# Patient Record
Sex: MALE | URBAN - METROPOLITAN AREA
[De-identification: names, ages, dates, MRNs, and addresses within clinical notes are randomized per-mention and may not be internally consistent; named-entity substitution may affect disease eponyms.]

---

## 2017-03-30 NOTE — PATIENT DISCUSSION
MACULAR HOLE, OS:  HOLE LARGER TODAY. DISCUSSED OPTIONS OF NOT DOING ANYTHING AND CONTINUING TO MONITOR, OPTION OF SURGERY PPV/MP/ICG OS. PT WILL CALL IF SHE DECIDES TO GO FORTH WITH SURGERY.

## 2017-05-08 NOTE — PATIENT DISCUSSION
MILD DRY EYES: PRESCRIBED ARTIFICIAL TEARS BID - QID, OU RETURN FOR FOLLOW-UP AS SCHEDULED OR SOONER IF SYMPTOMS WORSEN. ADVISED PT TO TRY PAZEO.

## 2017-06-14 NOTE — PATIENT DISCUSSION
S/P PE IOL,OS. DOING WELL. CONTINUE DROPS AS DIRECTED. SPECS RX OFFERED. RX ARC IN SPECS TO MINIMIZE GLARE. RETURN FOR FOLLOW-UP AS SCHEDULED.

## 2017-06-21 NOTE — PATIENT DISCUSSION
MACULAR HOLE, OS:  LIKELY TO PROCEED WITH SURGERY. W/IN A MONTH - PPV/MP/ICG OS. Dung Edouard RETURN FOR FOLLOW-UP AS SCHEDULED.

## 2017-06-21 NOTE — PATIENT DISCUSSION
Macular Hole Counseling:  I have explained to the patient at length the diagnosis of macular hole and its pathophysiology. I have discussed the possible need for surgery, which will be determined by the retinal specialist . Return for follow-up as scheduled.

## 2017-08-02 NOTE — PATIENT DISCUSSION
POST OP OS: 90% GAS. ADVISED PT TO BE FACE DOWN 1-2 DAYS AS MUCH AS POSSIBLE. PT DOING WELL TODAY. RETINA IS ATTACHED. IOP IS WITHIN ACCEPTABLE LIMITS. VISION SHOULD START TO IMPROVE. IF ANY WORSENING OF VISION OR PAIN, PT IS TO CALL IMMEDIATELY. RETINAL DETACHMENT AND ENDOPHTHALMTIS PRECAUTIONS REVIEWED. INSTRUCTED PATIENT TO WEAR EYE SHIELD WHEN SLEEPING FOR THE FIRST WEEK AFTER SURGERY.

## 2017-08-09 NOTE — PATIENT DISCUSSION
POST OP OS: PT DOING WELL TODAY. RETINA IS ATTACHED. IOP IS WITHIN ACCEPTABLE LIMITS. VISION SHOULD START TO IMPROVE. IF ANY WORSENING OF VISION OR PAIN, PT IS TO CALL IMMEDIATELY. RETINAL DETACHMENT AND ENDOPHTHALMTIS PRECAUTIONS REVIEWED. INSTRUCTED PATIENT TO WEAR EYE SHIELD WHEN SLEEPING FOR THE FIRST WEEK AFTER SURGERY.

## 2017-11-06 NOTE — PATIENT DISCUSSION
MACULAR HOLE OS:  MAC HOLE CLOSED. NO FLUID. EXPLAINED THAT RETINA IS BRAIN TISSUE AND VA CAN GET BETTER OVER TIME, BUT IT IS NOT INSTANT AS CAT SX IS.

## 2018-07-11 NOTE — PATIENT DISCUSSION
PT ADVISED TO USE A THICKER ARTIFICIAL TEAR SUCH AS CELLUVISC OR REFRESH GEL DROPS TO HELP COAT THE EYE BETTER TO HELP WITH DRYNESS. ADVISED PT IT WILL BLUR VISION TEMPORARILY BC IT IS A THICKER DROP, BUT WILL HELP SOOTHE EYES AND HELP ALLEVIATE SYMPTOMS.

## 2019-01-20 NOTE — PATIENT DISCUSSION
CATARACT OS: REFER TO DR FARIA FOR CAT SX OS. Over shift, pt was transitioned from BiPAP 10/5 to HHFO 45L 30%    Dr. Asher Rx to keep flow high and Fi02 low, if possible.     Sp02 88-92% appropriate for this pt per MD.     Paul Q4    Home 02 3.5-4L continuously

## 2019-01-23 NOTE — PATIENT DISCUSSION
"AMD (Dry) Counseling: I have instructed the patient to take an AREDS 2 vitamin mixture to minimize the risk of developing ""wet"" macular degeneration disease progression. The importance of daily monitoring with Amsler grid was emphasized and an Amsler grid was provided if the patient did not have one. The patient was advised to call the office if new symptoms of persistent blurring or distortion of vision arises as evaluation and possible treatment is necessary to preserve as much vision as possible. Return for follow-up as scheduled. "

## 2019-01-23 NOTE — PATIENT DISCUSSION
New Prescription: Acular (ketorolac): drops: 0.5% 1 drop four times a day as needed into affected eye 01-

## 2019-01-23 NOTE — PATIENT DISCUSSION
Diabetes without Retinopathy Counseling :  I have discussed with the patient the importance of controlling blood glucose, blood pressure and lipid levels levels  to minimize the risk of developing retinal disease from diabetes. I explained the importance of annual dilated eye exams because effective treatment for diabetic retinopathy depends on timely intervention. The patient was instructed to call or return sooner if they noticed blurred or distorted vision, fluctuating vision, pain or redness around one or both eyes. Return for follow-up as scheduled.

## 2019-08-02 ENCOUNTER — IMPORTED ENCOUNTER (OUTPATIENT)
Dept: URBAN - METROPOLITAN AREA CLINIC 50 | Facility: CLINIC | Age: 69
End: 2019-08-02

## 2019-08-26 ENCOUNTER — IMPORTED ENCOUNTER (OUTPATIENT)
Dept: URBAN - METROPOLITAN AREA CLINIC 50 | Facility: CLINIC | Age: 69
End: 2019-08-26

## 2019-10-02 ENCOUNTER — IMPORTED ENCOUNTER (OUTPATIENT)
Dept: URBAN - METROPOLITAN AREA CLINIC 50 | Facility: CLINIC | Age: 69
End: 2019-10-02

## 2020-01-08 ENCOUNTER — IMPORTED ENCOUNTER (OUTPATIENT)
Dept: URBAN - METROPOLITAN AREA CLINIC 50 | Facility: CLINIC | Age: 70
End: 2020-01-08

## 2020-01-13 ENCOUNTER — IMPORTED ENCOUNTER (OUTPATIENT)
Dept: URBAN - METROPOLITAN AREA CLINIC 50 | Facility: CLINIC | Age: 70
End: 2020-01-13

## 2020-01-16 ENCOUNTER — IMPORTED ENCOUNTER (OUTPATIENT)
Dept: URBAN - METROPOLITAN AREA CLINIC 50 | Facility: CLINIC | Age: 70
End: 2020-01-16

## 2020-02-17 ENCOUNTER — IMPORTED ENCOUNTER (OUTPATIENT)
Dept: URBAN - METROPOLITAN AREA CLINIC 50 | Facility: CLINIC | Age: 70
End: 2020-02-17

## 2020-02-27 ENCOUNTER — IMPORTED ENCOUNTER (OUTPATIENT)
Dept: URBAN - METROPOLITAN AREA CLINIC 50 | Facility: CLINIC | Age: 70
End: 2020-02-27

## 2020-03-03 ENCOUNTER — IMPORTED ENCOUNTER (OUTPATIENT)
Dept: URBAN - METROPOLITAN AREA CLINIC 50 | Facility: CLINIC | Age: 70
End: 2020-03-03

## 2020-03-12 ENCOUNTER — IMPORTED ENCOUNTER (OUTPATIENT)
Dept: URBAN - METROPOLITAN AREA CLINIC 50 | Facility: CLINIC | Age: 70
End: 2020-03-12

## 2020-05-08 ENCOUNTER — IMPORTED ENCOUNTER (OUTPATIENT)
Dept: URBAN - METROPOLITAN AREA CLINIC 50 | Facility: CLINIC | Age: 70
End: 2020-05-08

## 2020-05-18 ENCOUNTER — IMPORTED ENCOUNTER (OUTPATIENT)
Dept: URBAN - METROPOLITAN AREA CLINIC 50 | Facility: CLINIC | Age: 70
End: 2020-05-18

## 2020-06-05 ENCOUNTER — IMPORTED ENCOUNTER (OUTPATIENT)
Dept: URBAN - METROPOLITAN AREA CLINIC 50 | Facility: CLINIC | Age: 70
End: 2020-06-05

## 2020-10-07 ENCOUNTER — IMPORTED ENCOUNTER (OUTPATIENT)
Dept: URBAN - METROPOLITAN AREA CLINIC 50 | Facility: CLINIC | Age: 70
End: 2020-10-07

## 2020-11-19 ENCOUNTER — IMPORTED ENCOUNTER (OUTPATIENT)
Dept: URBAN - METROPOLITAN AREA CLINIC 50 | Facility: CLINIC | Age: 70
End: 2020-11-19

## 2021-04-23 ASSESSMENT — VISUAL ACUITY
OD_OTHER: >20/400. 20/50.
OS_SC: 20/40
OD_CC: 20/70-2
OS_SC: 20/30+2
OD_CC: J1+
OD_CC: J2
OD_BAT: 20/70
OD_PH: 20/20
OS_OTHER: >20/400. >20/400.
OD_SC: 20/60
OD_PH: 20/30
OD_OTHER: >20/400. 20/50.
OD_BAT: >20/400
OD_OTHER: >20/400.
OD_SC: 20/60+2
OS_SC: 20/25
OS_OTHER: 20/30. 20/30.
OD_OTHER: 20/30. 20/50.
OD_PH: 20/30
OD_BAT: >20/400
OS_BAT: 20/20
OS_SC: 20/20
OD_SC: 20/50
OD_SC: 20/50-1
OS_BAT: 20/30
OD_CC: 20/80
OS_CC: 20/40-2
OD_PH: 20/20
OS_CC: J2
OS_BAT: >20/400
OS_OTHER: >20/400. >20/400.
OD_CC: J2
OD_SC: 20/50+1
OS_BAT: >20/400
OS_BAT: >20/400
OS_OTHER: 20/20. 20/30.
OD_OTHER: 20/70.
OS_CC: 20/30+
OS_CC: J1+
OS_CC: J2
OD_BAT: >20/400
OD_BAT: 20/30
OS_SC: 20/40
OD_SC: 20/40-2
OS_CC: 20/60-1
OS_OTHER: >20/400.

## 2021-04-23 ASSESSMENT — TONOMETRY
OS_IOP_MMHG: 15
OS_IOP_MMHG: 16
OD_IOP_MMHG: 19
OS_IOP_MMHG: 18
OD_IOP_MMHG: 16
OD_IOP_MMHG: 16
OD_IOP_MMHG: 20
OD_IOP_MMHG: 14
OS_IOP_MMHG: 16
OD_IOP_MMHG: 15
OS_IOP_MMHG: 16
OS_IOP_MMHG: 19

## 2021-05-24 NOTE — PATIENT DISCUSSION
POSTERIOR CAPSULAR FIBROSIS, OU___ - WORSENING/VISUALLY SIGNIFICANT. SCHEDULE YAG CAPSULOTOMY OS___ FIRST THEN LATER YAG CAPSULOTOMY OD____ IF VISUAL SYMPTOMS PERSIST.

## 2021-05-24 NOTE — PATIENT DISCUSSION
Posterior Capsular Fibrosis Counseling: The diagnosis of posterior capsular fibrosis (PCF), also referred to as a secondary cataract or posterior capsular opacification (PCO), was discussed with the patient. The patient understands that their symptoms and limitations are likely related to this condition. I have reviewed the risks, benefits and alternatives of  YAG laser surgery for the treatment of the fibrosis. The uncommon risk of an increase in intraocular pressure or a retinal detachment and their associated symptoms were explained to the patient. The patient understands and desires to proceed with the laser surgery to improve their vision for ___READING AND DRIVING AT NIGHT_____.

## 2021-07-06 NOTE — PATIENT DISCUSSION
Pre-Op 2nd Eye Yag Counseling: The patient has noticed an improvement in their visual symptoms in the operative eye. The patient complains of decreased vision in the fellow eye when _driving and reading small print___. It was explained to the patient that the decision to proceed with Yag laser in the fellow eye is entirely a separate decision from the surgical eye. All of the same risks, benefits and alternatives ere reviewed with the patient again. The patient does feel the vision in the non-operative eye is limiting their daily activities and elects to proceed with Yag laser in the __right_____ eye.

## 2021-11-15 ENCOUNTER — PREPPED CHART (OUTPATIENT)
Dept: URBAN - METROPOLITAN AREA CLINIC 50 | Facility: CLINIC | Age: 71
End: 2021-11-15

## 2022-03-24 ENCOUNTER — COMPREHENSIVE EXAM (OUTPATIENT)
Dept: URBAN - METROPOLITAN AREA CLINIC 50 | Facility: CLINIC | Age: 72
End: 2022-03-24

## 2022-03-24 DIAGNOSIS — H43.813: ICD-10-CM

## 2022-03-24 DIAGNOSIS — H52.4: ICD-10-CM

## 2022-03-24 DIAGNOSIS — H26.493: ICD-10-CM

## 2022-03-24 DIAGNOSIS — H35.373: ICD-10-CM

## 2022-03-24 DIAGNOSIS — E11.9: ICD-10-CM

## 2022-03-24 PROCEDURE — 92015 DETERMINE REFRACTIVE STATE: CPT

## 2022-03-24 PROCEDURE — 92134 CPTRZ OPH DX IMG PST SGM RTA: CPT

## 2022-03-24 PROCEDURE — 92014 COMPRE OPH EXAM EST PT 1/>: CPT

## 2022-03-24 ASSESSMENT — VISUAL ACUITY
OS_GLARE: 20/80
OD_CC: J3
OU_CC: 20/30
OS_CC: J1
OU_SC: 20/25+2
OS_CC: 20/30
OD_CC: 20/40
OD_PH: 20/25
OS_SC: 20/25
OD_GLARE: 20/60
OS_PH: 20/25
OD_GLARE: 20/100
OS_GLARE: 20/30
OU_CC: J1+
OD_SC: 20/60

## 2022-03-24 ASSESSMENT — TONOMETRY
OS_IOP_MMHG: 14
OD_IOP_MMHG: 15

## 2022-10-13 ENCOUNTER — ESTABLISHED PATIENT (OUTPATIENT)
Dept: URBAN - METROPOLITAN AREA CLINIC 50 | Facility: CLINIC | Age: 72
End: 2022-10-13

## 2022-10-13 DIAGNOSIS — E11.9: ICD-10-CM

## 2022-10-13 DIAGNOSIS — Z79.4: ICD-10-CM

## 2022-10-13 DIAGNOSIS — H52.4: ICD-10-CM

## 2022-10-13 PROCEDURE — 92015GRNC REFRACTION GLASSES RECHECK NO CHARGE

## 2022-10-13 PROCEDURE — 92012 INTRM OPH EXAM EST PATIENT: CPT

## 2022-10-13 ASSESSMENT — VISUAL ACUITY
OD_CC: 20/40
OS_CC: 20/30+2
OS_PH: 20/25-2
OD_PH: 20/30+1
OU_CC: 20/30

## 2022-10-13 ASSESSMENT — TONOMETRY
OD_IOP_MMHG: 14
OS_IOP_MMHG: 15

## 2022-12-06 NOTE — PATIENT DISCUSSION
Possible aion, patient has pale disc and ischemia.  Referring to retina specialist for further eval.

## 2023-01-24 NOTE — PATIENT DISCUSSION
Recommended to return to the care of Dr Merirll Ralph for baseline visual field testing and follow-up.

## 2023-01-24 NOTE — PATIENT DISCUSSION
Stressed the importance of returning to primary care provider Dr Margarette Hernandez for controlling vascular risk factors including hypertension and diabetes.

## 2023-07-14 ENCOUNTER — COMPREHENSIVE EXAM (OUTPATIENT)
Dept: URBAN - METROPOLITAN AREA CLINIC 50 | Facility: CLINIC | Age: 73
End: 2023-07-14

## 2023-07-14 DIAGNOSIS — H35.373: ICD-10-CM

## 2023-07-14 DIAGNOSIS — H43.813: ICD-10-CM

## 2023-07-14 DIAGNOSIS — H26.493: ICD-10-CM

## 2023-07-14 DIAGNOSIS — E11.9: ICD-10-CM

## 2023-07-14 DIAGNOSIS — Z79.4: ICD-10-CM

## 2023-07-14 DIAGNOSIS — H04.123: ICD-10-CM

## 2023-07-14 PROCEDURE — 92015 DETERMINE REFRACTIVE STATE: CPT

## 2023-07-14 PROCEDURE — 92014 COMPRE OPH EXAM EST PT 1/>: CPT

## 2023-07-14 PROCEDURE — 92134 CPTRZ OPH DX IMG PST SGM RTA: CPT

## 2023-07-14 ASSESSMENT — KERATOMETRY
OS_AXISANGLE2_DEGREES: 68
OD_K2POWER_DIOPTERS: 45.00
OS_K2POWER_DIOPTERS: 45.25
OD_AXISANGLE_DEGREES: 107
OS_AXISANGLE_DEGREES: 158
OS_K1POWER_DIOPTERS: 44.50
OD_AXISANGLE2_DEGREES: 17
OD_K1POWER_DIOPTERS: 43.50

## 2023-07-14 ASSESSMENT — VISUAL ACUITY
OD_GLARE: 20/40
OD_PH: 20/40
OU_SC: 20/30
OD_SC: 20/70
OS_GLARE: 20/40
OU_CC: J1+@14"
OS_SC: 20/30
OD_GLARE: 20/60
OS_GLARE: 20/70

## 2023-07-14 ASSESSMENT — TONOMETRY
OD_IOP_MMHG: 10
OS_IOP_MMHG: 11

## 2023-07-18 ENCOUNTER — CLINIC PROCEDURE ONLY (OUTPATIENT)
Dept: URBAN - METROPOLITAN AREA CLINIC 50 | Facility: CLINIC | Age: 73
End: 2023-07-18

## 2023-07-18 DIAGNOSIS — H26.491: ICD-10-CM

## 2023-07-18 PROCEDURE — 66821 AFTER CATARACT LASER SURGERY: CPT

## 2023-07-18 ASSESSMENT — VISUAL ACUITY
OS_GLARE: 20/30
OU_SC: 20/25
OD_GLARE: 20/40
OD_SC: 20/40
OS_GLARE: 20/50
OD_GLARE: 20/60
OS_SC: 20/25
OD_PH: 20/25-2

## 2023-07-18 ASSESSMENT — KERATOMETRY
OD_K2POWER_DIOPTERS: 45.00
OS_K1POWER_DIOPTERS: 44.50
OS_K2POWER_DIOPTERS: 45.25
OS_AXISANGLE_DEGREES: 158
OD_AXISANGLE_DEGREES: 107
OD_K1POWER_DIOPTERS: 43.50
OS_AXISANGLE2_DEGREES: 68
OD_AXISANGLE2_DEGREES: 17

## 2023-07-18 ASSESSMENT — TONOMETRY
OD_IOP_MMHG: 12
OS_IOP_MMHG: 12

## 2023-08-09 ENCOUNTER — CLINIC PROCEDURE ONLY (OUTPATIENT)
Dept: URBAN - METROPOLITAN AREA CLINIC 50 | Facility: LOCATION | Age: 73
End: 2023-08-09

## 2023-08-09 DIAGNOSIS — H26.492: ICD-10-CM

## 2023-08-09 PROCEDURE — 66821 AFTER CATARACT LASER SURGERY: CPT

## 2023-08-09 ASSESSMENT — KERATOMETRY
OD_AXISANGLE2_DEGREES: 17
OD_K2POWER_DIOPTERS: 45.00
OS_AXISANGLE_DEGREES: 158
OS_K1POWER_DIOPTERS: 44.50
OS_K2POWER_DIOPTERS: 45.25
OD_AXISANGLE_DEGREES: 107
OD_K1POWER_DIOPTERS: 43.50
OS_AXISANGLE2_DEGREES: 68

## 2023-08-09 ASSESSMENT — TONOMETRY
OD_IOP_MMHG: 12
OS_IOP_MMHG: 12

## 2023-08-09 ASSESSMENT — VISUAL ACUITY
OS_SC: 20/25-1
OD_PH: 20/25-1
OU_SC: 20/25-1
OD_SC: 20/40

## 2023-09-21 ENCOUNTER — POST-OP (OUTPATIENT)
Dept: URBAN - METROPOLITAN AREA CLINIC 50 | Facility: LOCATION | Age: 73
End: 2023-09-21

## 2023-09-21 DIAGNOSIS — Z98.890: ICD-10-CM

## 2023-09-21 PROCEDURE — 92015GRNC REFRACTION GLASSES RECHECK NO CHARGE

## 2023-09-21 PROCEDURE — 99024 POSTOP FOLLOW-UP VISIT: CPT

## 2023-09-21 ASSESSMENT — VISUAL ACUITY
OD_PH: 20/30
OS_SC: 20/25-2
OD_SC: 20/70
OD_SC: J1
OS_SC: J4+2

## 2023-09-21 ASSESSMENT — KERATOMETRY
OD_K2POWER_DIOPTERS: 45.00
OS_AXISANGLE_DEGREES: 99
OS_AXISANGLE2_DEGREES: 9
OS_K1POWER_DIOPTERS: 44.25
OS_K2POWER_DIOPTERS: 44.75
OD_K1POWER_DIOPTERS: 43.50
OD_AXISANGLE_DEGREES: 109
OD_AXISANGLE2_DEGREES: 19

## 2023-09-21 ASSESSMENT — TONOMETRY
OS_IOP_MMHG: 11
OD_IOP_MMHG: 12

## 2025-05-14 ENCOUNTER — APPOINTMENT (OUTPATIENT)
Dept: URBAN - METROPOLITAN AREA CLINIC 86 | Facility: CLINIC | Age: 75
Setting detail: DERMATOLOGY
End: 2025-05-14

## 2025-05-14 DIAGNOSIS — B07.8 OTHER VIRAL WARTS: ICD-10-CM

## 2025-05-14 DIAGNOSIS — L57.8 OTHER SKIN CHANGES DUE TO CHRONIC EXPOSURE TO NONIONIZING RADIATION: ICD-10-CM

## 2025-05-14 DIAGNOSIS — L81.4 OTHER MELANIN HYPERPIGMENTATION: ICD-10-CM

## 2025-05-14 PROCEDURE — ? LIQUID NITROGEN

## 2025-05-14 PROCEDURE — ? FULL BODY SKIN EXAM - DECLINED

## 2025-05-14 PROCEDURE — 17110 DESTRUCTION B9 LES UP TO 14: CPT

## 2025-05-14 PROCEDURE — 99203 OFFICE O/P NEW LOW 30 MIN: CPT | Mod: 25

## 2025-05-14 PROCEDURE — ? COUNSELING

## 2025-05-14 ASSESSMENT — LOCATION SIMPLE DESCRIPTION DERM
LOCATION SIMPLE: LEFT UPPER BACK
LOCATION SIMPLE: CHEST
LOCATION SIMPLE: CHEST
LOCATION SIMPLE: LEFT UPPER BACK

## 2025-05-14 ASSESSMENT — LOCATION DETAILED DESCRIPTION DERM
LOCATION DETAILED: LEFT SUPERIOR UPPER BACK
LOCATION DETAILED: LEFT SUPERIOR UPPER BACK
LOCATION DETAILED: LEFT MEDIAL INFERIOR CHEST
LOCATION DETAILED: LEFT MEDIAL INFERIOR CHEST

## 2025-05-14 ASSESSMENT — LOCATION ZONE DERM
LOCATION ZONE: TRUNK
LOCATION ZONE: TRUNK

## 2025-05-14 ASSESSMENT — TOTAL NUMBER OF VERRUCA VULGARIS: # OF LESIONS?: 1

## 2025-05-14 NOTE — PROCEDURE: LIQUID NITROGEN
Medical Necessity Clause: This procedure was medically necessary because the lesions that were treated were:
Spray Paint Technique: No
Number Of Freeze-Thaw Cycles: 3 freeze-thaw cycles
Show Aperture Variable?: Yes
Post-Care Instructions: I reviewed with the patient in detail post-care instructions. Patient is to wear sunprotection, and avoid picking at any of the treated lesions. Pt may apply Vaseline to crusted or scabbing areas.
Detail Level: Detailed
Medical Necessity Information: It is in your best interest to select a reason for this procedure from the list below. All of these items fulfill various CMS LCD requirements except the new and changing color options.
Pared With?: curette
Spray Paint Text: The liquid nitrogen was applied to the skin utilizing a spray paint frosting technique.
Consent: The patient's consent was obtained including but not limited to risks of crusting, scabbing, blistering, scarring, darker or lighter pigmentary change, recurrence, incomplete removal and infection.